# Patient Record
Sex: MALE | Race: WHITE | NOT HISPANIC OR LATINO | URBAN - NONMETROPOLITAN AREA
[De-identification: names, ages, dates, MRNs, and addresses within clinical notes are randomized per-mention and may not be internally consistent; named-entity substitution may affect disease eponyms.]

---

## 2017-01-03 ENCOUNTER — OFFICE VISIT (OUTPATIENT)
Dept: RETAIL CLINIC | Facility: CLINIC | Age: 42
End: 2017-01-03

## 2017-01-03 VITALS
OXYGEN SATURATION: 98 % | HEIGHT: 69 IN | HEART RATE: 95 BPM | SYSTOLIC BLOOD PRESSURE: 116 MMHG | DIASTOLIC BLOOD PRESSURE: 74 MMHG | BODY MASS INDEX: 30.9 KG/M2 | TEMPERATURE: 97.1 F | WEIGHT: 208.6 LBS | RESPIRATION RATE: 20 BRPM

## 2017-01-03 DIAGNOSIS — J40 BRONCHITIS: ICD-10-CM

## 2017-01-03 DIAGNOSIS — J01.00 ACUTE NON-RECURRENT MAXILLARY SINUSITIS: Primary | ICD-10-CM

## 2017-01-03 PROCEDURE — 99203 OFFICE O/P NEW LOW 30 MIN: CPT | Performed by: NURSE PRACTITIONER

## 2017-01-03 RX ORDER — AMOXICILLIN AND CLAVULANATE POTASSIUM 875; 125 MG/1; MG/1
1 TABLET, FILM COATED ORAL 2 TIMES DAILY
Qty: 20 TABLET | Refills: 0 | Status: SHIPPED | OUTPATIENT
Start: 2017-01-03 | End: 2017-01-13

## 2017-01-03 NOTE — PROGRESS NOTES
"Subjective   Humberto Loza is a 41 y.o. male.     HPI Comments: Patient reports a 9 day history of cold symptoms with nasal congestion and PND that seemed to improve until about 4 days ago when it worsened again and chest congestion developed with burning in his chest. Minimal sputum production. Using mucinex and OTC sinus med with no relief though no meds in the last 3 days. No wheezing. Never checked for fever.     URI    Associated symptoms include congestion, coughing, ear pain, headaches, rhinorrhea and a sore throat. Pertinent negatives include no wheezing.        Allergies   Allergen Reactions   • Kenalog [Triamcinolone Acetonide] Hives         The following portions of the patient's history were reviewed and updated as appropriate: allergies, current medications, past family history, past medical history, past social history, past surgical history and problem list.    Review of Systems   Constitutional: Negative for chills and fever.   HENT: Positive for congestion, ear pain, postnasal drip, rhinorrhea, sinus pressure and sore throat. Negative for facial swelling and trouble swallowing.    Respiratory: Positive for cough. Negative for choking and wheezing.    Neurological: Positive for headaches.       Objective     Visit Vitals   • /74 (BP Location: Right arm)   • Pulse 95   • Temp 97.1 °F (36.2 °C) (Oral)   • Resp 20   • Ht 69\" (175.3 cm)   • Wt 208 lb 9.6 oz (94.6 kg)   • SpO2 98%   • BMI 30.8 kg/m2       Physical Exam  General Appearance:  Well-appearing, well-developed, well hydrated, well nourished, no acute distress, alert and oriented, appears stated age, comfortable, pleasant, cooperative  Head/face:  Normocephalic, atraumatic with bilateral maxillary sinus tenderness to palpation  Eyes:  PERRLA, EOMI, no conjunctivitis or icterus  Ears:  Bilateral TMs are dull gray with diffuse light reflex, canals are clear, no pinna or tragus tenderness with palpation  Nose/Sinuses:  Nares are mildly " congested bilaterally  Mouth/Throat:  Posterior pharynx is mildly erythematous with a moderate amount of clear drainage  Neck:  Supple without lymphadenopathy or thyromegaly; trachea is midline  Lungs:  Scattered rhonchi bilateral upper lobes  Heart:  Regular rate and rhythm without murmur, gallop or rub  Neurologic:  Alert; no focal deficits; age appropriate behavior and speech      Assessment/Plan   Humberto was seen today for uri.    Diagnoses and all orders for this visit:    Acute non-recurrent maxillary sinusitis    Bronchitis    Other orders  -     amoxicillin-clavulanate (AUGMENTIN) 875-125 MG per tablet; Take 1 tablet by mouth 2 (Two) Times a Day for 10 days.        Continue Mucinex plain. Discussed dosing, side effects, recommended other symptomatic care.  Patient instructions and visit summary given as documented. Patient should follow up with primary care provider if symptoms worsen, fail to resolve or other symptoms need attention. Patient/family agree to the above.              MELO Maldonado

## 2017-01-03 NOTE — MR AVS SNAPSHOT
Humbertojaison Loza   1/3/2017 12:00 PM   Office Visit    Dept Phone:  [phone]   Encounter #:  [id number]    Provider:  Provider Bec Sterling   Department:  [facility] CARE                Your Full Care Plan              Today's Medication Changes          These changes are accurate as of: 1/3/17 12:19 PM.  If you have any questions, ask your nurse or doctor.               New Medication(s)Ordered:     amoxicillin-clavulanate 875-125 MG per tablet   Commonly known as:  AUGMENTIN   Take 1 tablet by mouth 2 (Two) Times a Day for 10 days.   Started by:  Provider Sonya Blunt            Where to Get Your Medications      These medications were sent to [facility] [address] [place] [address] [phone] [facility] [phone] [address] [place] [address]     Phone:  [phone]     amoxicillin-clavulanate 875-125 MG per tablet                  Your Updated Medication List          This list is accurate as of: 1/3/17 12:19 PM.  Always use your most recent med list.                amoxicillin-clavulanate 875-125 MG per tablet   Commonly known as:  AUGMENTIN   Take 1 tablet by mouth 2 (Two) Times a Day for 10 days.       CLARITIN PO       METFORMIN HCL PO               You Were Diagnosed With        Codes Comments    Acute non-recurrent maxillary sinusitis    -  Primary ICD-10-CM: J01.00  ICD-9-CM: 461.0     Bronchitis     ICD-10-CM: J40  ICD-9-CM: 490       Instructions    Sinusitis, Adult    You have been diagnosed with a sinus infection (sinusitis).  Sinusitis is redness, soreness, and inflammation of the paranasal sinuses. Paranasal sinuses are air pockets within the bones of your face. They are located beneath your eyes, in the middle of your forehead, and above your eyes. In healthy paranasal sinuses, mucus is able to drain out, and air is able to circulate through them by way of your nose. However, when your paranasal sinuses are inflamed, mucus and air can become  trapped. This can allow bacteria and other germs to grow and cause infection.    Symptoms of sinusitis may include pain and pressure around the affected sinuses, upper toothache, earache, headache, bad breath, decreased sense of smell and taste, cough, fatigue, fever, thick drainage from your nose, which often is green and may contain pus, or swelling and warmth over the affected sinuses.    An antibiotic is prescribed for you today that needs to be taken until gone, whether or not you feel better. It is recommended that you eat yogurt while taking an antibiotic. Over the counter antihistamines such as Zyrtec, Allegra or Claritin can help to dry mucus. Mucinex may help with thinning of post nasal drip and cough. Use Motrin or Tylenol for fever or pain. Increase your intake of fluids, get plenty of rest, use a humidifier or inhale steam 3-4 times a day (for example, sit in the bathroom with the shower running), and apply a warm, moist washcloth to your face 3-4 times a day. You may also use saline nasal spray 2 sprays in each nostril several times a day to help moisten and clean your sinuses.    You are diagnosed with bronchitis.   An antibiotic is prescribed for you today that needs to be taken until gone, whether or not you feel better. It is recommended you take a probiotic when taking an antibiotic. Probiotics are found over the counter in pill form and in some yogurt brands, both are recommended. . Use motrin or Tylenol for fever as needed. If your symptoms worsen, wheezing or shortness of air is not controlled with inhalers, or other concerns develop, you should follow up with your primary care provider or go to the ER as you may need further diagnostics such as x-ray and blood work. You verbalized an understanding of these instructions and a copy of this visit summary is provided.    MELO Maldonado    Follow up with your primary care provider if no improvement after 72 hours, or sooner for any increase  "in symptoms or concerns.     SEEK IMMEDIATE MEDICAL CARE IF:  · You have increasing pain or severe headaches.  · You have nausea, vomiting, or drowsiness.  · You have swelling around your face.  · You have vision problems.  · You have a stiff neck.  · You have difficulty breathing.     This information is not intended to replace advice given to you by your health care provider. Make sure you discuss any questions you have with your health care provider.     Pt verbalized understanding, visit summary given.      MELO Maldonado         Patient Instructions History      Upcoming Appointments     Visit Type Date Time Department    NEW PATIENT 1/3/2017 12:00 PM [facility]      [email or web address] Signup     [facility] [email or web address] allows you to send messages to your doctor, view your test results, renew your prescriptions, schedule appointments, and more. To sign up, go to [email or web address] and click on the Sign Up Now link in the New User? box. Enter your [email or web address] Activation Code exactly as it appears below along with the last four digits of your Social Security Number and your Date of Birth () to complete the sign-up process. If you do not sign up before the expiration date, you must request a new code.    [email or web address] Activation Code: [email or web address]  Expires: 2017 12:18 PM    If you have questions, you can email [email or web address] or call [phone] to talk to our [email or web address] staff. Remember, [email or web address] is NOT to be used for urgent needs. For medical emergencies, dial 911.               Other Info from Your Visit           Allergies     Kenalog [Triamcinolone Acetonide] Allergy Hives      Reason for Visit     URI           Vital Signs     Blood Pressure Pulse Temperature Respirations Height Weight    116/74 (BP Location: Right arm) 95 97.1 °F (36.2 °C) (Oral) 20 69\" (175.3 cm) 208 lb 9.6 oz (94.6 kg)    Oxygen Saturation Body Mass Index Smoking Status             98% 30.8 kg/m2 Never Smoker       "   Problems and Diagnoses Noted     Acute non-recurrent maxillary sinusitis    -  Primary    Bronchitis